# Patient Record
Sex: FEMALE | Race: WHITE | NOT HISPANIC OR LATINO | Employment: OTHER | URBAN - METROPOLITAN AREA
[De-identification: names, ages, dates, MRNs, and addresses within clinical notes are randomized per-mention and may not be internally consistent; named-entity substitution may affect disease eponyms.]

---

## 2020-09-11 ENCOUNTER — PRE-ADMISSION TESTING (OUTPATIENT)
Dept: ADMISSIONS | Facility: MEDICAL CENTER | Age: 45
End: 2020-09-11
Attending: ORTHOPAEDIC SURGERY
Payer: MEDICARE

## 2020-09-11 RX ORDER — LISDEXAMFETAMINE DIMESYLATE 60 MG/1
1 CAPSULE ORAL EVERY MORNING
COMMUNITY
Start: 2020-09-08

## 2020-09-11 RX ORDER — MELATONIN 10 MG
1 CAPSULE ORAL NIGHTLY PRN
COMMUNITY

## 2020-09-11 RX ORDER — CYCLOBENZAPRINE HCL 10 MG
10 TABLET ORAL
COMMUNITY
Start: 2020-08-15

## 2020-09-11 RX ORDER — HYDROXYZINE 50 MG/1
TABLET, FILM COATED ORAL
COMMUNITY
Start: 2020-09-01

## 2020-09-11 RX ORDER — HYDROCODONE BITARTRATE AND ACETAMINOPHEN 10; 325 MG/1; MG/1
TABLET ORAL
COMMUNITY
Start: 2020-09-09

## 2020-09-11 RX ORDER — ARIPIPRAZOLE 10 MG/1
10 TABLET ORAL
COMMUNITY
Start: 2020-09-01

## 2020-09-11 RX ORDER — PHENTERMINE HYDROCHLORIDE 37.5 MG/1
37.5 TABLET ORAL
COMMUNITY
Start: 2020-09-01

## 2020-09-11 RX ORDER — ONDANSETRON 4 MG/1
TABLET, FILM COATED ORAL
COMMUNITY
Start: 2020-06-15

## 2020-09-11 NOTE — OR NURSING
"Preparing for your Procedure information\" sheet given to patient with verbal and written instructions. Patient instructed to continue prescribed medications through the day before surgery, instructed to take the following medications the day of surgery per anesthesia protocol vyvanse, atarax, abilify, hydrocodone as needed, flexeril as needed, albuterol as needed.  Pt instructed not to take Phentermine for 2 weeks prior, pt states last dose of phentermine was 9/9/20.  EMail Dr. Arango BMI/phentermine ?.  Pt instructed to self isolate after her COVID test yesterday 9/10, pt agrees. Dr. Valiente's office called LM to obtain labwork, ekg and covid test results done in [place]. MB    "

## 2020-09-15 NOTE — OR NURSING
Phentermine has been stopped early enough - we have moved to a 7 day stoppage on this particular drug.  No other recommendations from my standpoint.  Ok to proceed pending PCP clearance.  Thank you.      Carlee Ricardo M.D.  [facility]

## 2020-09-17 ENCOUNTER — HOSPITAL ENCOUNTER (OUTPATIENT)
Facility: MEDICAL CENTER | Age: 45
End: 2020-09-17
Attending: ORTHOPAEDIC SURGERY | Admitting: ORTHOPAEDIC SURGERY
Payer: MEDICARE

## 2020-09-17 ENCOUNTER — ANESTHESIA (OUTPATIENT)
Dept: SURGERY | Facility: MEDICAL CENTER | Age: 45
End: 2020-09-17
Payer: MEDICARE

## 2020-09-17 ENCOUNTER — ANESTHESIA EVENT (OUTPATIENT)
Dept: SURGERY | Facility: MEDICAL CENTER | Age: 45
End: 2020-09-17
Payer: MEDICARE

## 2020-09-17 VITALS
HEART RATE: 89 BPM | TEMPERATURE: 97.2 F | SYSTOLIC BLOOD PRESSURE: 136 MMHG | HEIGHT: 65 IN | RESPIRATION RATE: 20 BRPM | BODY MASS INDEX: 44.33 KG/M2 | DIASTOLIC BLOOD PRESSURE: 84 MMHG | WEIGHT: 266.1 LBS | OXYGEN SATURATION: 98 %

## 2020-09-17 DIAGNOSIS — M25.562 ACUTE PAIN OF LEFT KNEE: ICD-10-CM

## 2020-09-17 LAB
APTT PPP: 27.4 SEC (ref 24.7–36)
HCG UR QL: NEGATIVE

## 2020-09-17 PROCEDURE — A9270 NON-COVERED ITEM OR SERVICE: HCPCS | Performed by: ANESTHESIOLOGY

## 2020-09-17 PROCEDURE — 85730 THROMBOPLASTIN TIME PARTIAL: CPT

## 2020-09-17 PROCEDURE — 700101 HCHG RX REV CODE 250: Performed by: ANESTHESIOLOGY

## 2020-09-17 PROCEDURE — 502580 HCHG PACK, KNEE ARTHROSCOPY: Performed by: ORTHOPAEDIC SURGERY

## 2020-09-17 PROCEDURE — 160025 RECOVERY II MINUTES (STATS): Performed by: ORTHOPAEDIC SURGERY

## 2020-09-17 PROCEDURE — 160029 HCHG SURGERY MINUTES - 1ST 30 MINS LEVEL 4: Performed by: ORTHOPAEDIC SURGERY

## 2020-09-17 PROCEDURE — 700111 HCHG RX REV CODE 636 W/ 250 OVERRIDE (IP): Performed by: ORTHOPAEDIC SURGERY

## 2020-09-17 PROCEDURE — 700102 HCHG RX REV CODE 250 W/ 637 OVERRIDE(OP): Performed by: ORTHOPAEDIC SURGERY

## 2020-09-17 PROCEDURE — 700111 HCHG RX REV CODE 636 W/ 250 OVERRIDE (IP): Performed by: ANESTHESIOLOGY

## 2020-09-17 PROCEDURE — 160048 HCHG OR STATISTICAL LEVEL 1-5: Performed by: ORTHOPAEDIC SURGERY

## 2020-09-17 PROCEDURE — 81025 URINE PREGNANCY TEST: CPT

## 2020-09-17 PROCEDURE — 700101 HCHG RX REV CODE 250: Performed by: ORTHOPAEDIC SURGERY

## 2020-09-17 PROCEDURE — 160046 HCHG PACU - 1ST 60 MINS PHASE II: Performed by: ORTHOPAEDIC SURGERY

## 2020-09-17 PROCEDURE — 700105 HCHG RX REV CODE 258: Performed by: ORTHOPAEDIC SURGERY

## 2020-09-17 PROCEDURE — 160002 HCHG RECOVERY MINUTES (STAT): Performed by: ORTHOPAEDIC SURGERY

## 2020-09-17 PROCEDURE — 501838 HCHG SUTURE GENERAL: Performed by: ORTHOPAEDIC SURGERY

## 2020-09-17 PROCEDURE — A9270 NON-COVERED ITEM OR SERVICE: HCPCS | Performed by: ORTHOPAEDIC SURGERY

## 2020-09-17 PROCEDURE — 160009 HCHG ANES TIME/MIN: Performed by: ORTHOPAEDIC SURGERY

## 2020-09-17 PROCEDURE — 160041 HCHG SURGERY MINUTES - EA ADDL 1 MIN LEVEL 4: Performed by: ORTHOPAEDIC SURGERY

## 2020-09-17 PROCEDURE — 160035 HCHG PACU - 1ST 60 MINS PHASE I: Performed by: ORTHOPAEDIC SURGERY

## 2020-09-17 PROCEDURE — 700102 HCHG RX REV CODE 250 W/ 637 OVERRIDE(OP): Performed by: ANESTHESIOLOGY

## 2020-09-17 RX ORDER — HYDROMORPHONE HYDROCHLORIDE 1 MG/ML
0.1 INJECTION, SOLUTION INTRAMUSCULAR; INTRAVENOUS; SUBCUTANEOUS
Status: DISCONTINUED | OUTPATIENT
Start: 2020-09-17 | End: 2020-09-17 | Stop reason: HOSPADM

## 2020-09-17 RX ORDER — HYDROCODONE BITARTRATE AND ACETAMINOPHEN 10; 325 MG/1; MG/1
1 TABLET ORAL EVERY 4 HOURS PRN
Qty: 30 TAB | Refills: 0 | Status: SHIPPED | OUTPATIENT
Start: 2020-09-17 | End: 2020-09-22

## 2020-09-17 RX ORDER — ONDANSETRON 2 MG/ML
4 INJECTION INTRAMUSCULAR; INTRAVENOUS
Status: COMPLETED | OUTPATIENT
Start: 2020-09-17 | End: 2020-09-17

## 2020-09-17 RX ORDER — SODIUM CHLORIDE, SODIUM LACTATE, POTASSIUM CHLORIDE, CALCIUM CHLORIDE 600; 310; 30; 20 MG/100ML; MG/100ML; MG/100ML; MG/100ML
INJECTION, SOLUTION INTRAVENOUS CONTINUOUS
Status: DISCONTINUED | OUTPATIENT
Start: 2020-09-17 | End: 2020-09-17 | Stop reason: HOSPADM

## 2020-09-17 RX ORDER — LIDOCAINE HYDROCHLORIDE 20 MG/ML
INJECTION, SOLUTION EPIDURAL; INFILTRATION; INTRACAUDAL; PERINEURAL PRN
Status: DISCONTINUED | OUTPATIENT
Start: 2020-09-17 | End: 2020-09-17 | Stop reason: SURG

## 2020-09-17 RX ORDER — ACETAMINOPHEN 500 MG
1000 TABLET ORAL ONCE
Status: COMPLETED | OUTPATIENT
Start: 2020-09-17 | End: 2020-09-17

## 2020-09-17 RX ORDER — DEXAMETHASONE SODIUM PHOSPHATE 4 MG/ML
INJECTION, SOLUTION INTRA-ARTICULAR; INTRALESIONAL; INTRAMUSCULAR; INTRAVENOUS; SOFT TISSUE PRN
Status: DISCONTINUED | OUTPATIENT
Start: 2020-09-17 | End: 2020-09-17 | Stop reason: SURG

## 2020-09-17 RX ORDER — KETOROLAC TROMETHAMINE 30 MG/ML
INJECTION, SOLUTION INTRAMUSCULAR; INTRAVENOUS PRN
Status: DISCONTINUED | OUTPATIENT
Start: 2020-09-17 | End: 2020-09-17 | Stop reason: SURG

## 2020-09-17 RX ORDER — ONDANSETRON 2 MG/ML
INJECTION INTRAMUSCULAR; INTRAVENOUS PRN
Status: DISCONTINUED | OUTPATIENT
Start: 2020-09-17 | End: 2020-09-17 | Stop reason: SURG

## 2020-09-17 RX ORDER — OXYCODONE HCL 5 MG/5 ML
10 SOLUTION, ORAL ORAL
Status: COMPLETED | OUTPATIENT
Start: 2020-09-17 | End: 2020-09-17

## 2020-09-17 RX ORDER — OXYCODONE HCL 5 MG/5 ML
5 SOLUTION, ORAL ORAL
Status: COMPLETED | OUTPATIENT
Start: 2020-09-17 | End: 2020-09-17

## 2020-09-17 RX ORDER — CEFAZOLIN SODIUM 1 G/3ML
INJECTION, POWDER, FOR SOLUTION INTRAMUSCULAR; INTRAVENOUS PRN
Status: DISCONTINUED | OUTPATIENT
Start: 2020-09-17 | End: 2020-09-17 | Stop reason: SURG

## 2020-09-17 RX ORDER — HYDROMORPHONE HYDROCHLORIDE 1 MG/ML
0.4 INJECTION, SOLUTION INTRAMUSCULAR; INTRAVENOUS; SUBCUTANEOUS
Status: DISCONTINUED | OUTPATIENT
Start: 2020-09-17 | End: 2020-09-17 | Stop reason: HOSPADM

## 2020-09-17 RX ORDER — MEPERIDINE HYDROCHLORIDE 25 MG/ML
12.5 INJECTION INTRAMUSCULAR; INTRAVENOUS; SUBCUTANEOUS
Status: DISCONTINUED | OUTPATIENT
Start: 2020-09-17 | End: 2020-09-17 | Stop reason: HOSPADM

## 2020-09-17 RX ORDER — HYDROMORPHONE HYDROCHLORIDE 1 MG/ML
0.2 INJECTION, SOLUTION INTRAMUSCULAR; INTRAVENOUS; SUBCUTANEOUS
Status: DISCONTINUED | OUTPATIENT
Start: 2020-09-17 | End: 2020-09-17 | Stop reason: HOSPADM

## 2020-09-17 RX ORDER — ROPIVACAINE HYDROCHLORIDE 5 MG/ML
INJECTION, SOLUTION EPIDURAL; INFILTRATION; PERINEURAL
Status: DISCONTINUED | OUTPATIENT
Start: 2020-09-17 | End: 2020-09-17 | Stop reason: HOSPADM

## 2020-09-17 RX ORDER — HYDRALAZINE HYDROCHLORIDE 20 MG/ML
5 INJECTION INTRAMUSCULAR; INTRAVENOUS
Status: DISCONTINUED | OUTPATIENT
Start: 2020-09-17 | End: 2020-09-17 | Stop reason: HOSPADM

## 2020-09-17 RX ORDER — HALOPERIDOL 5 MG/ML
1 INJECTION INTRAMUSCULAR
Status: DISCONTINUED | OUTPATIENT
Start: 2020-09-17 | End: 2020-09-17 | Stop reason: HOSPADM

## 2020-09-17 RX ORDER — DIPHENHYDRAMINE HYDROCHLORIDE 50 MG/ML
6.25 INJECTION INTRAMUSCULAR; INTRAVENOUS
Status: DISCONTINUED | OUTPATIENT
Start: 2020-09-17 | End: 2020-09-17 | Stop reason: HOSPADM

## 2020-09-17 RX ADMIN — LIDOCAINE HYDROCHLORIDE 60 MG: 20 INJECTION, SOLUTION EPIDURAL; INFILTRATION; INTRACAUDAL; PERINEURAL at 09:06

## 2020-09-17 RX ADMIN — SODIUM CHLORIDE, POTASSIUM CHLORIDE, SODIUM LACTATE AND CALCIUM CHLORIDE: 600; 310; 30; 20 INJECTION, SOLUTION INTRAVENOUS at 08:27

## 2020-09-17 RX ADMIN — DEXAMETHASONE SODIUM PHOSPHATE 8 MG: 4 INJECTION, SOLUTION INTRAMUSCULAR; INTRAVENOUS at 09:09

## 2020-09-17 RX ADMIN — ACETAMINOPHEN 1000 MG: 500 TABLET, FILM COATED ORAL at 08:38

## 2020-09-17 RX ADMIN — KETOROLAC TROMETHAMINE 30 MG: 30 INJECTION, SOLUTION INTRAMUSCULAR at 09:25

## 2020-09-17 RX ADMIN — OXYCODONE HYDROCHLORIDE 5 MG: 5 SOLUTION ORAL at 09:48

## 2020-09-17 RX ADMIN — PROPOFOL 200 MG: 10 INJECTION, EMULSION INTRAVENOUS at 09:06

## 2020-09-17 RX ADMIN — ONDANSETRON 4 MG: 2 INJECTION INTRAMUSCULAR; INTRAVENOUS at 09:52

## 2020-09-17 RX ADMIN — FENTANYL CITRATE 50 MCG: 50 INJECTION, SOLUTION INTRAMUSCULAR; INTRAVENOUS at 09:16

## 2020-09-17 RX ADMIN — FENTANYL CITRATE 25 MCG: 50 INJECTION, SOLUTION INTRAMUSCULAR; INTRAVENOUS at 09:47

## 2020-09-17 RX ADMIN — CEFAZOLIN 3 G: 1 INJECTION, POWDER, FOR SOLUTION INTRAVENOUS at 09:06

## 2020-09-17 RX ADMIN — FENTANYL CITRATE 50 MCG: 50 INJECTION, SOLUTION INTRAMUSCULAR; INTRAVENOUS at 09:06

## 2020-09-17 RX ADMIN — ONDANSETRON 4 MG: 2 INJECTION INTRAMUSCULAR; INTRAVENOUS at 09:24

## 2020-09-17 RX ADMIN — POVIDONE IODINE 15 ML: 100 SOLUTION TOPICAL at 08:27

## 2020-09-17 ASSESSMENT — PAIN DESCRIPTION - PAIN TYPE
TYPE: SURGICAL PAIN

## 2020-09-17 NOTE — OP REPORT
DATE OF SERVICE:  09/17/2020    SURGEON:  Kurt Jamison MD    ASSISTANT:  Iqra Wiley PA-C    ANESTHESIOLOGIST:  Kirill Polanco MD    ANESTHESIA:  General anesthesia.      PREOPERATIVE DIAGNOSES:    1.  Left knee recurrent medial meniscus tear.    2.  Right knee moderate osteoarthritis.      POSTOPERATIVE DIAGNOSES:    1.  Left knee recurrent medial meniscus tear.    2.  Right knee moderate osteoarthritis.    3.  Left knee synovitis.      PROCEDURES PERFORMED:    1.  Left knee arthroscopy.    2.  Left knee limited synovectomy.    3.  Left knee patella and medial femoral condyle chondroplasty.    4.  Left knee partial medial meniscectomy.      IMPLANTS:  None.      HISTORY OF PRESENT ILLNESS:  Patient is a very pleasant 45-year-old female who   experienced recurrent left knee pain approximately 1 year ago.  She was   diagnosed with recurrent medial meniscus tear.  She failed conservative   management.  As a result, we discussed surgical intervention.  Patient has   been n.p.o. since midnight.  She is medically cleared for surgery by the   anesthesia team.      INFORMED CONSENT:  Patient was informed of the risks, benefits and   alternatives of planned operation.  The risks include but not limited to   bleeding, infection, neurovascular damage, worsening osteoarthritis/cartilage   damage, recurrent meniscus tear, pain, stiffness, DVT, PE, MI, stroke, and   death.  Advanced directives were reviewed.  After answering all questions, the   patient elected to proceed with planned operation and informed consent form   was signed.      PROCEDURE:  Patient was identified in the preoperative holding area.  The   correct procedural side and site were identified and marked.  Patient was then   brought to the operating room and transferred to the operating room table   where all bony prominences were well padded.  She underwent a general   anesthesia.      The left knee was then cleaned with alcohol-soaked gauzes  and the joint   injected with 30 mL of 1% lidocaine with epinephrine.  The left lower   extremity was then prepped and draped in normal standard sterile fashion.  A   procedural pause was then performed by the operating room team.  Patient was   given IV antibiotics prior to incision.      I then began with a diagnostic arthroscopy via standard anteromedial and   anterolateral portals.  There was some diffuse grade III changes on the   undersurface of the patella with some surrounding unstable cartilage flaps.    Just a little bit of grade I softening within the trochlear groove.  No   obvious loose bodies or soft debris within the medial and lateral gutters.    Examination of the notch demonstrated inflammation and synovitis of the   infrapatellar fat pad.  The ACL and PCL appeared to be intact.  Examination of   the medial compartment demonstrated extensive complex tearing of the   posterior horn body and anterior horn of the medial meniscus.  There was a   large horizontal cleavage tear as well as multiple unstable flap tears.  There   were some areas of grade IV cartilage loss over the central part of the   medial femoral condyle as well as over the anterior part of the medial tibial   plateau.  Examination of the lateral compartment demonstrated no obvious tears   of lateral meniscus and relatively well-preserved cartilage over the lateral   femoral condyle and lateral tibial plateau.      I first performed synovectomy of the infrapatellar fat pad.  I then turned my   attention to the medial compartment.  Given the medial meniscus tear location   and pattern, I elected to proceed with a partial medial meniscectomy.  Using a   combination of arthroscopic biters and the oscillating suction shaver device,   I removed the torn and unstable fragments of the posterior horn body and   anterior horn of the medial meniscus.  Once the debridement had been   performed, the remaining rim width was about 4 mm.  The  remaining tissue was   stable on probing.  The posterior root attachment was intact and stable as   well.  A gentle chondroplasty was then performed over the medial femoral   condyle and patella once again noted some grade IV cartilage loss on both the   medial femoral condyle and medial tibial plateau.  We then scanned the entire   knee joint again including modified Gillquist maneuvers to confirm there were   no remaining loose bodies or soft tissue debris.  Meticulous hemostasis   obtained.  All instruments were then removed.      The portals were then copiously irrigated and closed with simple 3-0 Prolene   suture followed by Steri-Strips and Xeroform.  The knee joint was injected   with 30 mL of 0.5% ropivacaine and a sterile compressive dressing was applied   followed by URIEL hose stocking.      Needle and sponge counts were correct at the end of the procedure as reported   to the surgeon by the circulating nurse.  The patient tolerated the procedure   well with no complications.  The patient was then extubated by the anesthesia   team.      ESTIMATED BLOOD LOSS:  5 mL.      COMPLICATIONS:  None.      TOURNIQUET TIME:  None.      SPECIMENS:  None.      WOUND TYPE:  Type 1 clean.      POSTOPERATIVE PLAN:  The patient will be transferred back to the postoperative   unit.  I expect she will be discharged from the hospital later this morning   once mobilizing safely and tolerating oral medications.  She will remain   touchdown weightbearing on the left lower extremity with the use of crutches.    We will begin some physical therapy in the next 7-10 days.  The patient   cannot tolerate aspirin, so we will only perform mechanical DVT prophylaxis.       ____________________________________     MD ENIO Servin / ENRIQUE    DD:  09/17/2020 09:32:48  DT:  09/17/2020 09:48:25    D#:  [id number]  Job#:  [id number]

## 2020-09-17 NOTE — ANESTHESIA PROCEDURE NOTES
Airway    Date/Time: 9/17/2020 9:06 AM  Performed by: Kirill Polanco M.D.  Authorized by: Kirill Polanco M.D.     Location:  OR  Urgency:  Elective  Difficult Airway: No    Indications for Airway Management:  Anesthesia      Spontaneous Ventilation: absent    Sedation Level:  Deep  Preoxygenated: Yes    Mask Difficulty Assessment:  0 - not attempted  Final Airway Type:  Supraglottic airway  Final Supraglottic Airway:  Standard LMA    SGA Size:  4  Number of Attempts at Approach:  1

## 2020-09-17 NOTE — ANESTHESIA PREPROCEDURE EVALUATION
Past Medical History:   Diagnosis Date   • Arthritis     L knee   • Asthma     inhaler prn   • Bowel habit changes     constipation   • Dental disorder     no teeth, no dentures currently   • Diabetes (HCC)     prior to gastric surgery, no tx now   • Disorder of thyroid     hx of hypothryoid   • Gynecological disorder     PCOS, irregular periods   • Hypertension     resolved after GI surgery   • Pain     lower back pain, DJD per pt  L knee   • Psychiatric problem     Bipolar, PTSD, anxiety, ADHD   • Seizure ([facility]) 1980    seizures from age [age] yr old: resolved   • Stroke ([facility]) 1976    as a baby unknown reason         Relevant Problems   No relevant active problems       Physical Exam    Airway   Mallampati: II  TM distance: >3 FB  Neck ROM: full       Cardiovascular - normal exam  Rhythm: regular  Rate: normal  (-) murmur     Dental - normal exam  (+) upper dentures, lower dentures           Pulmonary - normal exam  Breath sounds clear to auscultation     Abdominal   (+) obese     Neurological - normal exam                 Anesthesia Plan    ASA 3   ASA physical status 3 criteria: morbid obesity - BMI greater than or equal to 40    Plan - general       Airway plan will be LMA        Induction: intravenous    Postoperative Plan: Postoperative administration of opioids is intended.    Pertinent diagnostic labs and testing reviewed    Informed Consent:    Anesthetic plan and risks discussed with patient.

## 2020-09-17 NOTE — DISCHARGE INSTRUCTIONS
ACTIVITY: Rest and take it easy for the first 24 hours.  A responsible adult is recommended to remain with you during that time.  It is normal to feel sleepy.  We encourage you to not do anything that requires balance, judgment or coordination.    MILD FLU-LIKE SYMPTOMS ARE NORMAL. YOU MAY EXPERIENCE GENERALIZED MUSCLE ACHES, THROAT IRRITATION, HEADACHE AND/OR SOME NAUSEA.    FOR 24 HOURS DO NOT:  Drive, operate machinery or run household appliances.  Drink beer or alcoholic beverages.   Make important decisions or sign legal documents.    SPECIAL INSTRUCTIONS: Discharge instructions per pre-printed handout. Toe touch weight bearing for first 5-7 days with use of crutches or walker; then weight bearing as tolerated.     DIET: To avoid nausea, slowly advance diet as tolerated, avoiding spicy or greasy foods for the first day.  Add more substantial food to your diet according to your physician's instructions.  INCREASE FLUIDS AND FIBER TO AVOID CONSTIPATION.    FOLLOW-UP APPOINTMENT:  A follow-up appointment should be arranged with your doctor in office or office or as previously scheduled; call to schedule.    You should CALL YOUR PHYSICIAN if you develop:  Fever greater than 101 degrees F.  Pain not relieved by medication, or persistent nausea or vomiting.  Excessive bleeding (blood soaking through dressing) or unexpected drainage from the wound.  Extreme redness or swelling around the incision site, drainage of pus or foul smelling drainage.  Inability to urinate or empty your bladder within 8 hours.  Problems with breathing or chest pain.    You should call 911 if you develop problems with breathing or chest pain.  If you are unable to contact your doctor or surgical center, you should go to the nearest emergency room or urgent care center.  Physician's (Dr. Jamison) telephone #: [phone]    If any questions arise, call your doctor.  If your doctor is not available, please feel free to call the Surgical  Center at [phone].  The Center is open Monday through Friday from 7AM to 7PM.  You can also call the HEALTH HOTLINE open 24 hours/day, 7 days/week and speak to a nurse at [phone], or toll free at [phone].    A registered nurse may call you a few days after your surgery to see how you are doing after your procedure.    MEDICATIONS: Resume taking daily medication.  Take prescribed pain medication with food.  If no medication is prescribed, you may take non-aspirin pain medication if needed.  PAIN MEDICATION CAN BE VERY CONSTIPATING.  Take a stool softener or laxative such as senokot, pericolace, or milk of magnesia if needed.    Prescription given for NORCO.  Last pain medication given Oxycodone 5mg at 9:48 AM  If your physician has prescribed pain medication that includes Acetaminophen (Tylenol), do not take additional Acetaminophen (Tylenol) while taking the prescribed medication.    Depression / Suicide Risk    As you are discharged from this [facility] facility, it is important to learn how to keep safe from harming yourself.    Recognize the warning signs:  · Abrupt changes in personality, positive or negative- including increase in energy   · Giving away possessions  · Change in eating patterns- significant weight changes-  positive or negative  · Change in sleeping patterns- unable to sleep or sleeping all the time   · Unwillingness or inability to communicate  · Depression  · Unusual sadness, discouragement and loneliness  · Talk of wanting to die  · Neglect of personal appearance   · Rebelliousness- reckless behavior  · Withdrawal from people/activities they love  · Confusion- inability to concentrate     If you or a loved one observes any of these behaviors or has concerns about self-harm, here's what you can do:  · Talk about it- your feelings and reasons for harming yourself  · Remove any means that you might use to hurt yourself (examples: pills, rope, extension cords,  firearm)  · Get professional help from the community (Mental Health, Substance Abuse, psychological counseling)  · Do not be alone:Call your Safe Contact- someone whom you trust who will be there for you.  · Call your local CRISIS HOTLINE 458-9220 or 988-370-6709  · Call your local Children's Mobile Crisis Response Team [place] (488) 058-6786 or www.[employer]  · Call the toll free National Suicide Prevention Hotlines   · National Suicide Prevention Lifeline 850-916-MATZ (7010)  · National Hope Line Network 800-SUICIDE (711-4623)

## 2020-09-17 NOTE — ANESTHESIA TIME REPORT
Anesthesia Start and Stop Event Times     Date Time Event    9/17/2020 0849 Ready for Procedure     0903 Anesthesia Start     0934 Anesthesia Stop        Responsible Staff  09/17/20    Name Role Begin End    Kirill Polanco M.D. Anesth 0903 0934        Preop Diagnosis (Free Text):  Pre-op Diagnosis     KNEE PAIN LEFT, OTHER TEAR OF MEDIAL MENISCUS LEFT KNEE        Preop Diagnosis (Codes):    Post op Diagnosis  Tear of medial meniscus of left knee, current, unspecified tear type, initial encounter      Premium Reason  Non-Premium    Comments:

## 2020-09-17 NOTE — OR NURSING
0936 Pt arrived from OR, s/p left knee arthroscopy.  Report received from anesthesia and OR RN. Simple mask to 6L O2 in place, breath sounds clear bilaterally. Surgical site to left knee covered in surgical dressing; clean, dry and intact. Ice applied to site. Cap refill <3, pedal pulse +2. SCDs and Mika hose in place and machine functional. Pt arousing to voice at this time however remains drowsy and unable to verbalize pain, appears comfortable with unlabored breathing.   0950 Pt states pain 5/10; see MAR. Denies nausea and tolerating sips of water.   0952 Pt c/o nausea post narcotic administration; see MAR.   1005 Pt states pain has improved, 3/10. Nausea has also resolved.   1018 Pt states pain is controlled and tolerable, denies nausea. Pt tolerating PO fluids. Meets stage II criteria, Report to TYREE Galloway. Pt transferred to Phase II

## 2020-09-17 NOTE — ANESTHESIA POSTPROCEDURE EVALUATION
Patient: Gladys Giles    Procedure Summary     Date: 09/17/20 Room / Location:  OR  / SURGERY [facility]    Anesthesia Start: 0903 Anesthesia Stop: 0934    Procedures:       ARTHROSCOPY, KNEE (Left Knee)      MENISCECTOMY, KNEE, MEDIAL - PARTIAL AND AUGUSTINE (Left Knee) Diagnosis: (KNEE PAIN LEFT, OTHER TEAR OF MEDIAL MENISCUS LEFT KNEE)    Surgeon: Kurt Jamison M.D. Responsible Provider: Kirill Polanco M.D.    Anesthesia Type: general ASA Status: 3          Final Anesthesia Type: general  Last vitals  BP   Blood Pressure: 136/84    Temp   36.2 °C (97.2 °F)    Pulse   Pulse: 89   Resp   20    SpO2   98 %      Anesthesia Post Evaluation    Patient location during evaluation: PACU  Patient participation: complete - patient participated  Level of consciousness: awake and alert    Airway patency: patent  Anesthetic complications: no  Cardiovascular status: hemodynamically stable  Respiratory status: acceptable  Hydration status: euvolemic    PONV: none           Nurse Pain Score: 0 (NPRS)

## 2021-01-28 ENCOUNTER — HOSPITAL ENCOUNTER (OUTPATIENT)
Dept: HOSPITAL 8 - STAR | Age: 46
Discharge: HOME | End: 2021-01-28
Attending: ORTHOPAEDIC SURGERY
Payer: MEDICARE

## 2021-01-28 DIAGNOSIS — Z20.822: ICD-10-CM

## 2021-01-28 DIAGNOSIS — Z01.818: ICD-10-CM

## 2021-01-28 DIAGNOSIS — R00.0: ICD-10-CM

## 2021-01-28 DIAGNOSIS — Z01.810: Primary | ICD-10-CM

## 2021-01-28 DIAGNOSIS — M17.12: ICD-10-CM

## 2021-01-28 DIAGNOSIS — M25.562: ICD-10-CM

## 2021-01-28 LAB
ALBUMIN SERPL-MCNC: 3.2 G/DL (ref 3.4–5)
ALP SERPL-CCNC: 95 U/L (ref 45–117)
ALT SERPL-CCNC: 40 U/L (ref 12–78)
ANION GAP SERPL CALC-SCNC: 5 MMOL/L (ref 5–15)
APTT BLD: 24 SECONDS (ref 25–31)
BASOPHILS # BLD AUTO: 0.1 X10^3/UL (ref 0–0.1)
BASOPHILS NFR BLD AUTO: 1 % (ref 0–1)
BILIRUB SERPL-MCNC: 0.4 MG/DL (ref 0.2–1)
CALCIUM SERPL-MCNC: 9.4 MG/DL (ref 8.5–10.1)
CHLORIDE SERPL-SCNC: 110 MMOL/L (ref 98–107)
CREAT SERPL-MCNC: 0.89 MG/DL (ref 0.55–1.02)
EOSINOPHIL # BLD AUTO: 0 X10^3/UL (ref 0–0.4)
EOSINOPHIL NFR BLD AUTO: 0 % (ref 1–7)
ERYTHROCYTE [DISTWIDTH] IN BLOOD BY AUTOMATED COUNT: 15.1 % (ref 9.6–15.2)
INR PPP: 0.92 (ref 0.93–1.1)
LYMPHOCYTES # BLD AUTO: 3.1 X10^3/UL (ref 1–3.4)
LYMPHOCYTES NFR BLD AUTO: 25 % (ref 22–44)
MCH RBC QN AUTO: 31.8 PG (ref 27–34.8)
MCHC RBC AUTO-ENTMCNC: 34 G/DL (ref 32.4–35.8)
MD: (no result)
MONOCYTES # BLD AUTO: 1 X10^3/UL (ref 0.2–0.8)
MONOCYTES NFR BLD AUTO: 8 % (ref 2–9)
NEUTROPHILS # BLD AUTO: 8 X10^3/UL (ref 1.8–6.8)
NEUTROPHILS NFR BLD AUTO: 65 % (ref 42–75)
PLATELET # BLD AUTO: 401 X10^3/UL (ref 130–400)
PMV BLD AUTO: 7.3 FL (ref 7.4–10.4)
PROT SERPL-MCNC: 7.5 G/DL (ref 6.4–8.2)
PROTHROMBIN TIME: 9.9 SECONDS (ref 9.6–11.5)
RBC # BLD AUTO: 4.4 X10^6/UL (ref 3.82–5.3)

## 2021-01-28 PROCEDURE — 85730 THROMBOPLASTIN TIME PARTIAL: CPT

## 2021-01-28 PROCEDURE — 87147 CULTURE TYPE IMMUNOLOGIC: CPT

## 2021-01-28 PROCEDURE — 87635 SARS-COV-2 COVID-19 AMP PRB: CPT

## 2021-01-28 PROCEDURE — 87081 CULTURE SCREEN ONLY: CPT

## 2021-01-28 PROCEDURE — 85025 COMPLETE CBC W/AUTO DIFF WBC: CPT

## 2021-01-28 PROCEDURE — 93005 ELECTROCARDIOGRAM TRACING: CPT

## 2021-01-28 PROCEDURE — 85610 PROTHROMBIN TIME: CPT

## 2021-01-28 PROCEDURE — 80053 COMPREHEN METABOLIC PANEL: CPT

## 2021-01-28 PROCEDURE — 83036 HEMOGLOBIN GLYCOSYLATED A1C: CPT

## 2021-01-29 LAB — EST. AVERAGE GLUCOSE BLD GHB EST-MCNC: 163 MG/DL (ref 0–126)

## 2021-02-03 ENCOUNTER — HOSPITAL ENCOUNTER (OUTPATIENT)
Dept: HOSPITAL 8 - OUT | Age: 46
Discharge: HOME | End: 2021-02-03
Attending: ORTHOPAEDIC SURGERY
Payer: MEDICARE

## 2021-02-03 VITALS — BODY MASS INDEX: 42.56 KG/M2 | HEIGHT: 67 IN | WEIGHT: 271.17 LBS

## 2021-02-03 VITALS — DIASTOLIC BLOOD PRESSURE: 114 MMHG | SYSTOLIC BLOOD PRESSURE: 147 MMHG

## 2021-02-03 DIAGNOSIS — Z79.899: ICD-10-CM

## 2021-02-03 DIAGNOSIS — M17.12: Primary | ICD-10-CM

## 2021-02-03 DIAGNOSIS — Z53.8: ICD-10-CM

## 2021-02-03 LAB — HCG UR SG: 1.01 (ref 1–1.03)

## 2021-02-03 PROCEDURE — 81025 URINE PREGNANCY TEST: CPT

## 2021-02-03 PROCEDURE — 82962 GLUCOSE BLOOD TEST: CPT

## 2023-12-17 ENCOUNTER — APPOINTMENT (OUTPATIENT)
Dept: RADIOLOGY | Facility: MEDICAL CENTER | Age: 48
End: 2023-12-17
Attending: EMERGENCY MEDICINE
Payer: MEDICARE

## 2023-12-17 ENCOUNTER — HOSPITAL ENCOUNTER (EMERGENCY)
Facility: MEDICAL CENTER | Age: 48
End: 2023-12-17
Attending: EMERGENCY MEDICINE
Payer: MEDICARE

## 2023-12-17 VITALS
HEIGHT: 67 IN | TEMPERATURE: 98.4 F | RESPIRATION RATE: 20 BRPM | SYSTOLIC BLOOD PRESSURE: 151 MMHG | HEART RATE: 80 BPM | BODY MASS INDEX: 33.81 KG/M2 | OXYGEN SATURATION: 94 % | WEIGHT: 215.39 LBS | DIASTOLIC BLOOD PRESSURE: 96 MMHG

## 2023-12-17 DIAGNOSIS — M25.511 ACUTE PAIN OF RIGHT SHOULDER: ICD-10-CM

## 2023-12-17 LAB
BASOPHILS # BLD AUTO: 0.7 % (ref 0–1.8)
BASOPHILS # BLD: 0.08 K/UL (ref 0–0.12)
CRP SERPL HS-MCNC: <0.3 MG/DL (ref 0–0.75)
EOSINOPHIL # BLD AUTO: 0.14 K/UL (ref 0–0.51)
EOSINOPHIL NFR BLD: 1.2 % (ref 0–6.9)
ERYTHROCYTE [DISTWIDTH] IN BLOOD BY AUTOMATED COUNT: 44.5 FL (ref 35.9–50)
ERYTHROCYTE [SEDIMENTATION RATE] IN BLOOD BY WESTERGREN METHOD: 5 MM/HOUR (ref 0–25)
HCT VFR BLD AUTO: 44 % (ref 37–47)
HGB BLD-MCNC: 14.4 G/DL (ref 12–16)
IMM GRANULOCYTES # BLD AUTO: 0.03 K/UL (ref 0–0.11)
IMM GRANULOCYTES NFR BLD AUTO: 0.3 % (ref 0–0.9)
LYMPHOCYTES # BLD AUTO: 3.13 K/UL (ref 1–4.8)
LYMPHOCYTES NFR BLD: 26.8 % (ref 22–41)
MCH RBC QN AUTO: 28.7 PG (ref 27–33)
MCHC RBC AUTO-ENTMCNC: 32.7 G/DL (ref 32.2–35.5)
MCV RBC AUTO: 87.6 FL (ref 81.4–97.8)
MONOCYTES # BLD AUTO: 0.87 K/UL (ref 0–0.85)
MONOCYTES NFR BLD AUTO: 7.4 % (ref 0–13.4)
NEUTROPHILS # BLD AUTO: 7.43 K/UL (ref 1.82–7.42)
NEUTROPHILS NFR BLD: 63.6 % (ref 44–72)
NRBC # BLD AUTO: 0 K/UL
NRBC BLD-RTO: 0 /100 WBC (ref 0–0.2)
PLATELET # BLD AUTO: 374 K/UL (ref 164–446)
PMV BLD AUTO: 9.3 FL (ref 9–12.9)
RBC # BLD AUTO: 5.02 M/UL (ref 4.2–5.4)
WBC # BLD AUTO: 11.7 K/UL (ref 4.8–10.8)

## 2023-12-17 PROCEDURE — 85025 COMPLETE CBC W/AUTO DIFF WBC: CPT

## 2023-12-17 PROCEDURE — 73030 X-RAY EXAM OF SHOULDER: CPT | Mod: RT

## 2023-12-17 PROCEDURE — 36415 COLL VENOUS BLD VENIPUNCTURE: CPT

## 2023-12-17 PROCEDURE — 86140 C-REACTIVE PROTEIN: CPT

## 2023-12-17 PROCEDURE — 700102 HCHG RX REV CODE 250 W/ 637 OVERRIDE(OP): Performed by: EMERGENCY MEDICINE

## 2023-12-17 PROCEDURE — 85652 RBC SED RATE AUTOMATED: CPT

## 2023-12-17 PROCEDURE — A9270 NON-COVERED ITEM OR SERVICE: HCPCS | Performed by: EMERGENCY MEDICINE

## 2023-12-17 PROCEDURE — 99284 EMERGENCY DEPT VISIT MOD MDM: CPT

## 2023-12-17 RX ORDER — OXYCODONE AND ACETAMINOPHEN 10; 325 MG/1; MG/1
1 TABLET ORAL ONCE
Status: COMPLETED | OUTPATIENT
Start: 2023-12-17 | End: 2023-12-17

## 2023-12-17 RX ORDER — LIDOCAINE 50 MG/G
1 PATCH TOPICAL EVERY 24 HOURS
Qty: 10 PATCH | Refills: 0 | Status: SHIPPED | OUTPATIENT
Start: 2023-12-17

## 2023-12-17 RX ADMIN — OXYCODONE AND ACETAMINOPHEN 1 TABLET: 10; 325 TABLET ORAL at 19:48

## 2023-12-17 ASSESSMENT — LIFESTYLE VARIABLES
TOTAL SCORE: 0
HAVE YOU EVER FELT YOU SHOULD CUT DOWN ON YOUR DRINKING: NO
EVER HAD A DRINK FIRST THING IN THE MORNING TO STEADY YOUR NERVES TO GET RID OF A HANGOVER: NO
TOTAL SCORE: 0
CONSUMPTION TOTAL: NEGATIVE
HAVE PEOPLE ANNOYED YOU BY CRITICIZING YOUR DRINKING: NO
HOW MANY TIMES IN THE PAST YEAR HAVE YOU HAD 5 OR MORE DRINKS IN A DAY: 0
ON A TYPICAL DAY WHEN YOU DRINK ALCOHOL HOW MANY DRINKS DO YOU HAVE: 0
EVER FELT BAD OR GUILTY ABOUT YOUR DRINKING: NO
AVERAGE NUMBER OF DAYS PER WEEK YOU HAVE A DRINK CONTAINING ALCOHOL: 0
TOTAL SCORE: 0
DO YOU DRINK ALCOHOL: NO

## 2023-12-18 NOTE — DISCHARGE INSTRUCTIONS
You were seen in the ER for right shoulder pain.  Thankfully your labs and imaging are reassuring.  You received a dose of opioid pain medication in the ER and your right arm was placed into a sling.  Please come out of the sling multiple times per day as I showed you in the ER to perform range of motion exercises so that you do not get frozen shoulder.  You need to follow-up with orthopedics and I have given you their contact information, call them tomorrow to schedule follow-up.  I called in a prescription for a Lidoderm patch on your behalf, I recommend you use it as directed.  Return with new or worsening symptoms.  I hope you feel better soon!

## 2023-12-18 NOTE — ED NOTES
ERP at bedside. Pt agrees with plan of care discussed by ERP. Michelle in low position, side rail up for pt safety. Call light within reach. Plan of care on-going

## 2023-12-18 NOTE — ED TRIAGE NOTES
"Chief Complaint   Patient presents with    Shoulder Pain     49 yo female ambulates to triage with right shoulder blade pain that started this AM.  Patient reports he did fall about 2 weeks ago but didn't notice any shoulder pain then.  Reports pain does radiate down arm if the area gets touched.  Denies any chest pain or SOB.      BP (!) 169/116   Pulse 74   Temp 36.5 °C (97.7 °F) (Temporal)   Resp 18   Ht 1.702 m (5' 7\")   Wt 97.7 kg (215 lb 6.2 oz)   SpO2 99%   BMI 33.73 kg/m²    "

## 2023-12-18 NOTE — ED PROVIDER NOTES
ED Provider Note    CHIEF COMPLAINT  Chief Complaint   Patient presents with    Shoulder Pain     47 yo female ambulates to triage with right shoulder blade pain that started this AM.  Patient reports he did fall about 2 weeks ago but didn't notice any shoulder pain then.  Reports pain does radiate down arm if the area gets touched.  Denies any chest pain or SOB.         EXTERNAL RECORDS REVIEWED  External ED Note seen at [facility] 7 days ago for left knee pain and was diagnosed with an acute nondisplaced patella fracture.  She was given a 5-day course of Percocet.     HPI/ROS  LIMITATION TO HISTORY   Select: : None  OUTSIDE HISTORIAN(S):  None    Gladys Giles is a 48 y.o. female who presents with a chief complaint of right shoulder pain that started this morning.  She denies any trauma or known inciting event.  She has not tried any medication for her symptoms because she cannot take NSAIDs due to history of gastric sleeve and Tylenol does not work for her.  She is having difficulty ranging the right shoulder and right elbow.  She has a history of stroke with deficits in the right upper extremity but notes that she is usually able to use the right arm without difficulty.  She denies any fevers, chills, chest pain.  She does not use IV drugs.    PAST MEDICAL HISTORY   has a past medical history of Arthritis, Asthma, Bowel habit changes, Dental disorder, Diabetes (HCC), Disorder of thyroid, Gynecological disorder, Hypertension, Pain, Psychiatric problem, Seizure ([facility]) (1980), and Stroke ([facility]) (1976).    SURGICAL HISTORY   has a past surgical history that includes cholecystectomy (1986); knee arthroscopy (Left, 1996,2000); tubal coagulation laparoscopic bilateral (2018); carpal tunnel release (Left, 2000); primary c section (2010); primary c section (2018); other abdominal surgery (2015); knee scope,diagnostic (Left, 9/17/2020); and meniscectomy, knee, medial (Left, 9/17/2020).    FAMILY HISTORY  History  "reviewed. No pertinent family history.    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 (Registered Nurse) on 12/17/23 at 1811  Med List Status: Not Addressed     Medication Last Dose Status   Acetaminophen 500 MG Chew Tab  Active   albuterol (PROVENTIL) 2.5 mg/0.5 mL Nebu Soln  Active   ARIPiprazole (ABILIFY) 10 MG Tab  Active   cyclobenzaprine (FLEXERIL) 10 MG Tab  Active   HYDROcodone/acetaminophen (NORCO)  MG Tab  Active   hydrOXYzine HCl (ATARAX) 50 MG Tab  Active   Melatonin 10 MG Cap  Active   ondansetron (ZOFRAN) 4 MG Tab tablet  Active   oxyCODONE-acetaminophen (PERCOCET-10)  MG Tab  Active   phentermine (ADIPEX-P) 37.5 MG tablet  Active   traZODone (DESYREL) 100 MG Tab  Active   VYVANSE 60 MG Cap  Active                    ALLERGIES  Allergies   Allergen Reactions    Codeine Itching     Severe itching    Strattera [Atomoxetine Hcl] Unspecified     High blood pressure    Topamax [Topiramate] Unspecified     hallucinations    Aspirin Vomiting     Nausea      Benadryl [Altaryl] Itching     Itching only with po dose    Depakote [Divalproex Sodium] Unspecified     Weight gain    Nsaids Vomiting     GI upset/ vomiting    Seroquel [Quetiapine Fumarate] Unspecified     Over sedation         PHYSICAL EXAM  VITAL SIGNS: BP (!) 169/116   Pulse 74   Temp 36.5 °C (97.7 °F) (Temporal)   Resp 18   Ht 1.702 m (5' 7\")   Wt 97.7 kg (215 lb 6.2 oz)   SpO2 99%   BMI 33.73 kg/m²    Physical Exam  Vitals and nursing note reviewed.   Constitutional:       Appearance: Normal appearance.   HENT:      Head: Normocephalic and atraumatic.      Right Ear: External ear normal. "      Left Ear: External ear normal.      Nose: Nose normal.      Mouth/Throat:      Mouth: Mucous membranes are moist.      Pharynx: Oropharynx is clear.   Eyes:      Extraocular Movements: Extraocular movements intact.      Conjunctiva/sclera: Conjunctivae normal.      Pupils: Pupils are equal, round, and reactive to light.   Neck:      Comments: Right paraspinal cervical musculature tenderness.  Cardiovascular:      Rate and Rhythm: Normal rate.   Pulmonary:      Effort: Pulmonary effort is normal.   Musculoskeletal:      Cervical back: Normal range of motion and neck supple.      Comments: Decreased range of motion at the right shoulder.  Tender over the entire right shoulder extending down to the right scapula and right paraspinal cervical musculature.  There is tenderness extending down the right humerus.  No overlying deformity, erythema, crepitus, fluctuance.  Pain with passive extension of the right elbow and otherwise holds the right arm in flexion.   Skin:     General: Skin is warm and dry.   Neurological:      Mental Status: She is alert.      Comments: Right hand contractures (chronic)   Psychiatric:      Comments: Anxious but pleasant and cooperative.       DIAGNOSTIC STUDIES / PROCEDURES  LABS  Results for orders placed or performed during the hospital encounter of 12/17/23   CBC WITH DIFFERENTIAL   Result Value Ref Range    WBC 11.7 (H) 4.8 - 10.8 K/uL    RBC 5.02 4.20 - 5.40 M/uL    Hemoglobin 14.4 12.0 - 16.0 g/dL    Hematocrit 44.0 37.0 - 47.0 %    MCV 87.6 81.4 - 97.8 fL    MCH 28.7 27.0 - 33.0 pg    MCHC 32.7 32.2 - 35.5 g/dL    RDW 44.5 35.9 - 50.0 fL    Platelet Count 374 164 - 446 K/uL    MPV 9.3 9.0 - 12.9 fL    Neutrophils-Polys 63.60 44.00 - 72.00 %    Lymphocytes 26.80 22.00 - 41.00 %    Monocytes 7.40 0.00 - 13.40 %    Eosinophils 1.20 0.00 - 6.90 %    Basophils 0.70 0.00 - 1.80 %    Immature Granulocytes 0.30 0.00 - 0.90 %    Nucleated RBC 0.00 0.00 - 0.20 /100 WBC    Neutrophils  (Absolute) 7.43 (H) 1.82 - 7.42 K/uL    Lymphs (Absolute) 3.13 1.00 - 4.80 K/uL    Monos (Absolute) 0.87 (H) 0.00 - 0.85 K/uL    Eos (Absolute) 0.14 0.00 - 0.51 K/uL    Baso (Absolute) 0.08 0.00 - 0.12 K/uL    Immature Granulocytes (abs) 0.03 0.00 - 0.11 K/uL    NRBC (Absolute) 0.00 K/uL   Sed Rate   Result Value Ref Range    Sed Rate Westergren 5 0 - 25 mm/hour   CRP QUANTITIVE (NON-CARDIAC)   Result Value Ref Range    Stat C-Reactive Protein <0.30 0.00 - 0.75 mg/dL     RADIOLOGY  I have independently interpreted the diagnostic imaging associated with this visit and am waiting the final reading from the radiologist.   My preliminary interpretation is as follows: No fracture    Radiologist interpretation:   DX-SHOULDER 2+ RIGHT   Final Result      No acute osseous abnormality.        COURSE & MEDICAL DECISION MAKING    ED Observation Status? No; Patient does not meet criteria for ED Observation.     INITIAL ASSESSMENT, COURSE AND PLAN  Care Narrative: This is a 48-year-old female who is here with acute onset atraumatic right-sided shoulder and scapular pain with associated difficulty ranging the shoulder that began this morning.    Differential diagnosis includes, but is not limited to, fracture, contusion, dislocation, calcific tendinitis, adhesive capsulitis, septic arthritis, cellulitis, necrotizing soft tissue infection.    Arrives hypertensive but afebrile with otherwise normal vital signs.  She appears well-hydrated and nontoxic.  She has tenderness over the entire right shoulder both anterior and posterior extending down towards the right scapula and right humerus but there is no obvious deformity, overlying trauma such as ecchymosis.  No erythema, fluctuance, crepitus.  Low suspicion for cellulitis, abscess, NSTI. There is no rash or vesicles to suggest shingles.  She has very decreased range of motion at the right shoulder and has pain when I passively extend the right elbow.  She does not have risk factors  for septic arthritis but I will obtain an x-ray to evaluate for effusion as well as inflammatory markers and CBC.    Patient was given a dose of oral pain medication and placed in a sling for comfort.    She does have a mild leukocytosis to 11.7 with a slight elevation in her absolute neutrophil count but ESR and CRP are both well within normal limits.    An x-ray did not reveal osseous abnormality or joint effusion. Low suspicion for septic arthritis.    I reevaluated the patient at bedside. She is resting comfortably. At this point there is no indication for hospitalization or additional workup. She has an appointment with her orthopedic surgeon tomorrow for her known patella fracture. I recommended she discuss the right shoulder pain with them. She was instructed to come out of her shoulder sling multiple times per day to perform range of motion exercises. I gave her a prescription for lidoderm patches. Discharged in good and stable condition with strict return precautions.    HTN/IDDM FOLLOW UP:  The patient is referred to a primary physician for blood pressure management, diabetic screening, and for all other preventive health concerns    ADDITIONAL PROBLEM LIST  None  DISPOSITION AND DISCUSSIONS  I have discussed management of the patient with the following physicians and IRVIN's: None    Discussion of management with other [facility] or appropriate source(s): None     Escalation of care considered, and ultimately not performed:acute inpatient care management, however at this time, the patient is most appropriate for outpatient management    Barriers to care at this time, including but not limited to:  None .     Decision tools and prescription drugs considered including, but not limited to:  Lidoderm patch .    FINAL DIAGNOSIS  1. Acute pain of right shoulder      Electronically signed by: Oliver Collins M.D., 12/17/2023 7:10 PM

## (undated) DEVICE — PACK KNEE ARTHROSCOPY SM OR - (2EA/CA)

## (undated) DEVICE — SENSOR SPO2 NEO LNCS ADHESIVE (20/BX) SEE USER NOTES

## (undated) DEVICE — GLOVE, LITE (PAIR)

## (undated) DEVICE — LACTATED RINGERS INJ 1000 ML - (14EA/CA 60CA/PF)

## (undated) DEVICE — GLOVE BIOGEL SZ 8 SURGICAL PF LTX - (50PR/BX 4BX/CA)

## (undated) DEVICE — SUTURE 3-0 PROLENE PS-1 (12PK/BX)

## (undated) DEVICE — TUBING PATIENT W/CONNECTOR REDEUCE (1EA)

## (undated) DEVICE — PROTECTOR ULNA NERVE - (36PR/CA)

## (undated) DEVICE — GLOVE BIOGEL INDICATOR SZ 7SURGICAL PF LTX - (50/BX 4BX/CA)

## (undated) DEVICE — GLOVE BIOGEL INDICATOR SZ 8 SURGICAL PF LTX - (50/BX 4BX/CA)

## (undated) DEVICE — NEPTUNE 4 PORT MANIFOLD - (20/PK)

## (undated) DEVICE — TUBING PUMP WITH CONNECTOR REDEUCE (1EA)

## (undated) DEVICE — BLADE SHAVER AGGRESSIVE PLUS 4.0MM ANGLED (5EA/BX)

## (undated) DEVICE — GLOVE BIOGEL SZ 7 SURGICAL PF LTX - (50PR/BX 4BX/CA)

## (undated) DEVICE — SUTURE GENERAL

## (undated) DEVICE — DRAPE LARGE 3 QUARTER - (20/CA)

## (undated) DEVICE — SODIUM CHL. IRRIGATION 0.9% 3000ML (4EA/CA 65CA/PF)

## (undated) DEVICE — STERI STRIP COMPOUND BENZOIN - TINCTURE 0.6ML WITH APPLICATOR (40EA/BX)

## (undated) DEVICE — CLOSURE SKIN STRIP 1/2 X 4 IN - (STERI STRIP) (50/BX 4BX/CA)

## (undated) DEVICE — DRAPE LOWER EXTREMETY - (6/CA)

## (undated) DEVICE — PADDING CAST 6 IN STERILE - 6 X 4 YDS (24/CA)

## (undated) DEVICE — TUBING, SPIROMETRY KIT